# Patient Record
Sex: FEMALE | URBAN - METROPOLITAN AREA
[De-identification: names, ages, dates, MRNs, and addresses within clinical notes are randomized per-mention and may not be internally consistent; named-entity substitution may affect disease eponyms.]

---

## 2022-09-07 ENCOUNTER — HOSPITAL ENCOUNTER (OUTPATIENT)
Dept: LAB | Age: 27
Discharge: HOME | End: 2022-09-07
Payer: OTHER GOVERNMENT

## 2022-09-07 PROCEDURE — 88175 CYTOPATH C/V AUTO FLUID REDO: CPT

## 2023-10-06 LAB — HBA1C MFR BLD HPLC: 5.7 %

## 2023-10-12 ENCOUNTER — OFFICE VISIT (OUTPATIENT)
Facility: CLINIC | Age: 28
End: 2023-10-12
Payer: OTHER GOVERNMENT

## 2023-10-12 VITALS
SYSTOLIC BLOOD PRESSURE: 134 MMHG | RESPIRATION RATE: 22 BRPM | HEIGHT: 64 IN | TEMPERATURE: 96.8 F | DIASTOLIC BLOOD PRESSURE: 87 MMHG | HEART RATE: 73 BPM | WEIGHT: 243.2 LBS | OXYGEN SATURATION: 98 % | BODY MASS INDEX: 41.52 KG/M2

## 2023-10-12 DIAGNOSIS — Z13.89 OBESITY SCREEN: ICD-10-CM

## 2023-10-12 DIAGNOSIS — Z76.89 ENCOUNTER FOR WEIGHT MANAGEMENT: ICD-10-CM

## 2023-10-12 DIAGNOSIS — E66.01 CLASS 3 SEVERE OBESITY WITH BODY MASS INDEX (BMI) OF 40.0 TO 44.9 IN ADULT, UNSPECIFIED OBESITY TYPE, UNSPECIFIED WHETHER SERIOUS COMORBIDITY PRESENT (HCC): ICD-10-CM

## 2023-10-12 DIAGNOSIS — E28.2 PCOS (POLYCYSTIC OVARIAN SYNDROME): Primary | ICD-10-CM

## 2023-10-12 DIAGNOSIS — R73.03 PREDIABETES: ICD-10-CM

## 2023-10-12 DIAGNOSIS — Z76.89 ESTABLISHING CARE WITH NEW DOCTOR, ENCOUNTER FOR: ICD-10-CM

## 2023-10-12 PROCEDURE — 99401 PREV MED CNSL INDIV APPRX 15: CPT | Performed by: STUDENT IN AN ORGANIZED HEALTH CARE EDUCATION/TRAINING PROGRAM

## 2023-10-12 PROCEDURE — 99204 OFFICE O/P NEW MOD 45 MIN: CPT | Performed by: STUDENT IN AN ORGANIZED HEALTH CARE EDUCATION/TRAINING PROGRAM

## 2023-10-12 RX ORDER — METFORMIN HYDROCHLORIDE 750 MG/1
750 TABLET, EXTENDED RELEASE ORAL
Qty: 90 TABLET | Refills: 0 | Status: SHIPPED | OUTPATIENT
Start: 2023-10-12

## 2023-10-12 SDOH — ECONOMIC STABILITY: INCOME INSECURITY: HOW HARD IS IT FOR YOU TO PAY FOR THE VERY BASICS LIKE FOOD, HOUSING, MEDICAL CARE, AND HEATING?: NOT HARD AT ALL

## 2023-10-12 SDOH — ECONOMIC STABILITY: FOOD INSECURITY: WITHIN THE PAST 12 MONTHS, YOU WORRIED THAT YOUR FOOD WOULD RUN OUT BEFORE YOU GOT MONEY TO BUY MORE.: NEVER TRUE

## 2023-10-12 SDOH — ECONOMIC STABILITY: FOOD INSECURITY: WITHIN THE PAST 12 MONTHS, THE FOOD YOU BOUGHT JUST DIDN'T LAST AND YOU DIDN'T HAVE MONEY TO GET MORE.: NEVER TRUE

## 2023-10-12 SDOH — ECONOMIC STABILITY: HOUSING INSECURITY
IN THE LAST 12 MONTHS, WAS THERE A TIME WHEN YOU DID NOT HAVE A STEADY PLACE TO SLEEP OR SLEPT IN A SHELTER (INCLUDING NOW)?: NO

## 2023-10-12 ASSESSMENT — ENCOUNTER SYMPTOMS
ABDOMINAL PAIN: 0
SHORTNESS OF BREATH: 0

## 2023-10-12 ASSESSMENT — PATIENT HEALTH QUESTIONNAIRE - PHQ9
SUM OF ALL RESPONSES TO PHQ9 QUESTIONS 1 & 2: 0
SUM OF ALL RESPONSES TO PHQ QUESTIONS 1-9: 0
SUM OF ALL RESPONSES TO PHQ QUESTIONS 1-9: 0
2. FEELING DOWN, DEPRESSED OR HOPELESS: 0
1. LITTLE INTEREST OR PLEASURE IN DOING THINGS: 0
SUM OF ALL RESPONSES TO PHQ QUESTIONS 1-9: 0
SUM OF ALL RESPONSES TO PHQ QUESTIONS 1-9: 0

## 2023-10-12 NOTE — PROGRESS NOTES
1. \"Have you been to the ER, urgent care clinic since your last visit? Hospitalized since your last visit? \" No    2. \"Have you seen or consulted any other health care providers outside of the [address] [place] [address] since your last visit? \" No    3. For patients aged 43-73: Has the patient had a colonoscopy / FIT/ Cologuard? NA - based on age      If the patient is female:    4. For patients aged 43-66: Has the patient had a mammogram within the past 2 years? NA - based on age or sex      11. For patients aged 21-65: Has the patient had a pap smear?  Yes - no Care Gap present

## 2024-02-23 ENCOUNTER — OFFICE VISIT (OUTPATIENT)
Age: 29
End: 2024-02-23
Payer: OTHER GOVERNMENT

## 2024-02-23 VITALS
HEIGHT: 64 IN | OXYGEN SATURATION: 99 % | WEIGHT: 255 LBS | HEART RATE: 96 BPM | BODY MASS INDEX: 43.54 KG/M2 | TEMPERATURE: 98 F | SYSTOLIC BLOOD PRESSURE: 138 MMHG | DIASTOLIC BLOOD PRESSURE: 84 MMHG | RESPIRATION RATE: 16 BRPM

## 2024-02-23 DIAGNOSIS — E28.2 PCOS (POLYCYSTIC OVARIAN SYNDROME): ICD-10-CM

## 2024-02-23 DIAGNOSIS — K21.9 GASTROESOPHAGEAL REFLUX DISEASE, UNSPECIFIED WHETHER ESOPHAGITIS PRESENT: ICD-10-CM

## 2024-02-23 DIAGNOSIS — E66.01 MORBID OBESITY DUE TO EXCESS CALORIES (HCC): Primary | ICD-10-CM

## 2024-02-23 DIAGNOSIS — Z71.89 ENCOUNTER FOR PRE-BARIATRIC SURGERY COUNSELING AND EDUCATION: ICD-10-CM

## 2024-02-23 PROCEDURE — 99204 OFFICE O/P NEW MOD 45 MIN: CPT | Performed by: SURGERY

## 2024-02-23 NOTE — PROGRESS NOTES
Alondra De La Cruz is a 28 y.o. female (: 1995)     Chief Complaint   Patient presents with    New Patient     Bariatric consult       Medication list and allergies have been reviewed with Alondra De La Cruz and updated as of today's date.     I have gone over all Medical, Surgical and Social History with Alondra De La Cruz and updated/added the information accordingly.     
understands this is a life altering decision and will require compliance to the program for the remainder of their life in order to be monitored to avoid complication and ensure successful, sustained weight control. They will be placed on a lifelong low carbohydrate and low sugar diet, exercise, and vitamin regimen and will require frequent blood draws and office visits to ensure adequate nutrition and program compliance. Visits and follow up will be in compliance with the guidelines set forth by [employer]. I have specifically mentioned the need to avoid all personal and second-hand tobacco exposure, systemic steroids, and NSAIDS after any bariatric surgery to help avoid the above listed complications. The patient has expressed understanding of the above and would like to enroll in the program. The patient will be submitted for medical and psychological clearance along with establishing with out dietician and joining the pre / post operative support group. They will be screened for depression and sleep apnea and treated pre operatively if needed. After successful completion of the preoperative regimen the patient will be submitted for insurance approval and pending this will be scheduled for surgery.    Tests/clearances ordered:  CBC, CMP, A1c, H pylori, B1, B12, folate, TSH, Vitamin D, EKG, UGI (done), EGD  Nutrition, support group, PCP clearance, psychological clearance    All questions from the patient have been answered and they have demonstrated appropriate understanding of the process.      Signed By: Lb Kiran MD [facility]  Bariatric and General Surgeon  [facility]    February 23, 2024

## 2024-02-27 ENCOUNTER — TELEPHONE (OUTPATIENT)
Age: 29
End: 2024-02-27

## 2024-02-27 NOTE — TELEPHONE ENCOUNTER
I called and I LVM for patient to call back.   The patient was seen by Dr. Kiran on 2/23/2024.  The patient has  benefits and sheis interested in DS.  Upon further research  covers DS if the patient BMI is = or > 50.00 BMI.     According to this patient BMI, she will not qualify for DS.    Gloria

## 2025-02-08 SDOH — HEALTH STABILITY: PHYSICAL HEALTH: ON AVERAGE, HOW MANY MINUTES DO YOU ENGAGE IN EXERCISE AT THIS LEVEL?: 30 MIN

## 2025-02-08 SDOH — HEALTH STABILITY: PHYSICAL HEALTH: ON AVERAGE, HOW MANY DAYS PER WEEK DO YOU ENGAGE IN MODERATE TO STRENUOUS EXERCISE (LIKE A BRISK WALK)?: 3 DAYS

## 2025-02-11 ENCOUNTER — OFFICE VISIT (OUTPATIENT)
Facility: CLINIC | Age: 30
End: 2025-02-11
Payer: OTHER GOVERNMENT

## 2025-02-11 ENCOUNTER — HOSPITAL ENCOUNTER (OUTPATIENT)
Facility: HOSPITAL | Age: 30
Setting detail: SPECIMEN
Discharge: HOME | End: 2025-02-14
Payer: OTHER GOVERNMENT

## 2025-02-11 VITALS
SYSTOLIC BLOOD PRESSURE: 100 MMHG | OXYGEN SATURATION: 99 % | BODY MASS INDEX: 29.26 KG/M2 | HEART RATE: 77 BPM | HEIGHT: 65 IN | RESPIRATION RATE: 14 BRPM | TEMPERATURE: 97.8 F | WEIGHT: 175.6 LBS | DIASTOLIC BLOOD PRESSURE: 66 MMHG

## 2025-02-11 DIAGNOSIS — Z00.00 WELL WOMAN EXAM (NO GYNECOLOGICAL EXAM): Primary | ICD-10-CM

## 2025-02-11 DIAGNOSIS — Z76.89 ENCOUNTER TO ESTABLISH CARE: ICD-10-CM

## 2025-02-11 DIAGNOSIS — Z00.00 WELL WOMAN EXAM (NO GYNECOLOGICAL EXAM): ICD-10-CM

## 2025-02-11 LAB
ALBUMIN SERPL-MCNC: 3.9 G/DL (ref 3.4–5)
ALBUMIN/GLOB SERPL: 1 (ref 0.8–1.7)
ALP SERPL-CCNC: 81 U/L (ref 45–117)
ALT SERPL-CCNC: 19 U/L (ref 13–56)
ANION GAP SERPL CALC-SCNC: 6 MMOL/L (ref 3–18)
AST SERPL-CCNC: 16 U/L (ref 10–38)
BILIRUB SERPL-MCNC: 0.7 MG/DL (ref 0.2–1)
BUN SERPL-MCNC: 15 MG/DL (ref 7–18)
BUN/CREAT SERPL: 23 (ref 12–20)
CALCIUM SERPL-MCNC: 8.5 MG/DL (ref 8.5–10.1)
CHLORIDE SERPL-SCNC: 110 MMOL/L (ref 100–111)
CO2 SERPL-SCNC: 23 MMOL/L (ref 21–32)
CREAT SERPL-MCNC: 0.64 MG/DL (ref 0.6–1.3)
ERYTHROCYTE [DISTWIDTH] IN BLOOD BY AUTOMATED COUNT: ABNORMAL % (ref 11.6–14.5)
GLOBULIN SER CALC-MCNC: 3.8 G/DL (ref 2–4)
GLUCOSE SERPL-MCNC: 85 MG/DL (ref 74–99)
HCT VFR BLD AUTO: 30.3 % (ref 35–45)
HGB BLD-MCNC: 9.2 G/DL (ref 12–16)
MCH RBC QN AUTO: 23.3 PG (ref 24–34)
MCHC RBC AUTO-ENTMCNC: 30.4 G/DL (ref 31–37)
MCV RBC AUTO: 76.7 FL (ref 78–100)
NRBC # BLD: 0 K/UL (ref 0–0.01)
NRBC BLD-RTO: 0 PER 100 WBC
PLATELET # BLD AUTO: 262 K/UL (ref 135–420)
PMV BLD AUTO: 10.4 FL (ref 9.2–11.8)
POTASSIUM SERPL-SCNC: 4.2 MMOL/L (ref 3.5–5.5)
PROT SERPL-MCNC: 7.7 G/DL (ref 6.4–8.2)
RBC # BLD AUTO: 3.95 M/UL (ref 4.2–5.3)
SODIUM SERPL-SCNC: 139 MMOL/L (ref 136–145)
WBC # BLD AUTO: 4.9 K/UL (ref 4.6–13.2)

## 2025-02-11 PROCEDURE — 85027 COMPLETE CBC AUTOMATED: CPT

## 2025-02-11 PROCEDURE — 80053 COMPREHEN METABOLIC PANEL: CPT

## 2025-02-11 PROCEDURE — 99385 PREV VISIT NEW AGE 18-39: CPT | Performed by: NURSE PRACTITIONER

## 2025-02-11 PROCEDURE — 36415 COLL VENOUS BLD VENIPUNCTURE: CPT

## 2025-02-11 SDOH — ECONOMIC STABILITY: FOOD INSECURITY: WITHIN THE PAST 12 MONTHS, YOU WORRIED THAT YOUR FOOD WOULD RUN OUT BEFORE YOU GOT MONEY TO BUY MORE.: NEVER TRUE

## 2025-02-11 SDOH — ECONOMIC STABILITY: FOOD INSECURITY: WITHIN THE PAST 12 MONTHS, THE FOOD YOU BOUGHT JUST DIDN'T LAST AND YOU DIDN'T HAVE MONEY TO GET MORE.: NEVER TRUE

## 2025-02-11 ASSESSMENT — PATIENT HEALTH QUESTIONNAIRE - PHQ9
SUM OF ALL RESPONSES TO PHQ QUESTIONS 1-9: 0
SUM OF ALL RESPONSES TO PHQ9 QUESTIONS 1 & 2: 0
2. FEELING DOWN, DEPRESSED OR HOPELESS: NOT AT ALL
SUM OF ALL RESPONSES TO PHQ QUESTIONS 1-9: 0
SUM OF ALL RESPONSES TO PHQ QUESTIONS 1-9: 0
1. LITTLE INTEREST OR PLEASURE IN DOING THINGS: NOT AT ALL
SUM OF ALL RESPONSES TO PHQ QUESTIONS 1-9: 0

## 2025-02-11 ASSESSMENT — ENCOUNTER SYMPTOMS
RESPIRATORY NEGATIVE: 1
EYES NEGATIVE: 1

## 2025-02-11 NOTE — PROGRESS NOTES
Room 8      When asked if patient has any concerns she would like to address with Lory Wilkins NP patient states no concerns. Lory Wilkins NP has been notified  of patient concerns .       Did you take your medication today? Patient states no      1. \"Have you been to the ER, urgent care clinic since your last visit?  Hospitalized since your last visit?\" Patient states she went to [facility] for her blood transfusion on 01/09/25    2. \"Have you seen or consulted any other health care providers outside of the [facility] since your last visit?\"    3. For patients aged 45-75: Has the patient had a colonoscopy / FIT/ Cologuard? N/A      If the patient is female:    4. For patients aged 40-74: Has the patient had a mammogram within the past 2 years? N/A      5. For patients aged 21-65: Has the patient had a pap smear? {Cancer Care Gap present? Yes    When asked if patient menstrual cycle is normal patient states no, she miscarried in November and has been bleeding since, she is scheduled for a D&C on 02/12/25 2/11/2025    12:56 PM   PHQ-9    Little interest or pleasure in doing things 0   Feeling down, depressed, or hopeless 0   PHQ-2 Score 0   PHQ-9 Total Score 0          Health Maintenance Due   Topic Date Due    HIV screen  Never done    Hepatitis C screen  Never done    Polio vaccine (2 of 3 - Adult catch-up series) 09/26/2013    Hepatitis B vaccine (1 of 3 - 19+ 3-dose series) Never done    Varicella vaccine (1 of 2 - 13+ 2-dose series) Never done    DTaP/Tdap/Td vaccine (2 - Td or Tdap) 08/29/2023    Flu vaccine (1) 08/01/2024    COVID-19 Vaccine (3 - 2024-25 season) 09/01/2024    A1C test (Diabetic or Prediabetic)  10/06/2024    Depression Screen  10/12/2024

## 2025-02-11 NOTE — PROGRESS NOTES
[address] [place] [address]               [phone]      Alondra De La Cruz is a 29 y.o. female and presents with New Patient       Assessment/Plan:    1. Well woman exam (no gynecological exam)  -     Comprehensive Metabolic Panel; Future  -     CBC; Future  2. Encounter to establish care     Visit to establish care  Annual physical completed, no abnormal finding warranting further work up  Routine labs today  Patient verbalized understanding and is in agreement with this plan of care       Follow up and disposition:   Return in about 1 year (around 2/11/2026) for Physical, w/ogyn, 30min, office.      Subjective:    Labs obtained prior to visit? No  Reviewed with patient? N/A    Visit to establish care.     ROS:     Review of Systems   Constitutional: Negative.    HENT: Negative.     Eyes: Negative.    Respiratory: Negative.     Cardiovascular: Negative.    Endocrine: Negative.    Genitourinary:  Positive for vaginal bleeding.        Had miscarriage 11/2025, continues to bleed, seeing OB/GYN tomorrow for D&C     Musculoskeletal: Negative.    Skin: Negative.    Neurological: Negative.    Psychiatric/Behavioral: Negative.           The problem list was updated as a part of today's visit.  Patient Active Problem List   Diagnosis    PCOS (polycystic ovarian syndrome)    Morbid obesity due to excess calories       The PSH, FH were reviewed.      SH:  Social History     Tobacco Use    Smoking status: Never     Passive exposure: Never    Smokeless tobacco: Never   Vaping Use    Vaping status: Never Used   Substance Use Topics    Alcohol use: Not Currently    Drug use: Never       Medications/Allergies:  Current Outpatient Medications on File Prior to Visit   Medication Sig Dispense Refill    Calcium Citrate-Vitamin D (CA CITR+VIT D,CELEBRATE CALCIUM PLUS 500, TAB) 1 tablet daily Bariatric Vitamin      medroxyPROGESTERone (PROVERA) 10 MG tablet Take 1 tablet by mouth daily (Patient not

## 2025-03-22 ENCOUNTER — RESULTS FOLLOW-UP (OUTPATIENT)
Facility: HOSPITAL | Age: 30
End: 2025-03-22

## 2025-03-22 NOTE — TELEPHONE ENCOUNTER
Called patient about her low H/H.  States she had D&C and the bleeding has stopped. No signs and symptoms of anemia.

## 2025-06-03 ENCOUNTER — TELEPHONE (OUTPATIENT)
Facility: CLINIC | Age: 30
End: 2025-06-03

## 2025-06-03 NOTE — TELEPHONE ENCOUNTER
Pt called to inquire if she should go to the ER or Urgent care based on her recent lab results she received from Donna. She stated she had the labs done yesterday and received the results via her phone. She is requesting a call from someone within the clinical staff.     P: [phone]